# Patient Record
Sex: MALE | Race: WHITE | NOT HISPANIC OR LATINO | Employment: FULL TIME | URBAN - METROPOLITAN AREA
[De-identification: names, ages, dates, MRNs, and addresses within clinical notes are randomized per-mention and may not be internally consistent; named-entity substitution may affect disease eponyms.]

---

## 2017-12-14 ENCOUNTER — HOSPITAL ENCOUNTER (EMERGENCY)
Facility: MEDICAL CENTER | Age: 42
End: 2017-12-14
Attending: EMERGENCY MEDICINE
Payer: COMMERCIAL

## 2017-12-14 VITALS
HEIGHT: 70 IN | DIASTOLIC BLOOD PRESSURE: 83 MMHG | BODY MASS INDEX: 25.6 KG/M2 | TEMPERATURE: 98.3 F | RESPIRATION RATE: 16 BRPM | OXYGEN SATURATION: 96 % | WEIGHT: 178.79 LBS | SYSTOLIC BLOOD PRESSURE: 136 MMHG | HEART RATE: 74 BPM

## 2017-12-14 DIAGNOSIS — J01.10 ACUTE NON-RECURRENT FRONTAL SINUSITIS: ICD-10-CM

## 2017-12-14 PROCEDURE — 99283 EMERGENCY DEPT VISIT LOW MDM: CPT

## 2017-12-14 RX ORDER — FLUTICASONE PROPIONATE 50 MCG
1 SPRAY, SUSPENSION (ML) NASAL 2 TIMES DAILY
Qty: 16 G | Refills: 0 | Status: SHIPPED | OUTPATIENT
Start: 2017-12-14

## 2017-12-14 RX ORDER — AMOXICILLIN AND CLAVULANATE POTASSIUM 875; 125 MG/1; MG/1
1 TABLET, FILM COATED ORAL 2 TIMES DAILY
Qty: 14 TAB | Refills: 0 | Status: SHIPPED | OUTPATIENT
Start: 2017-12-14 | End: 2017-12-21

## 2017-12-14 ASSESSMENT — PAIN SCALES - GENERAL: PAINLEVEL_OUTOF10: 7

## 2017-12-14 NOTE — DISCHARGE INSTRUCTIONS

## 2017-12-14 NOTE — ED PROVIDER NOTES
"ED Provider Note    ER PROVIDER NOTE    Scribed for Josué Hardy M.D. by Josué Hardy. 12/14/2017 at 9:45 AM.      CHIEF COMPLAINT  Chief Complaint   Patient presents with   • Head Pain       HPI  Josué Downey is a 42 y.o. male who presents to the emergency department complaining of Congestion and sinus pressure. He reports that around 1 week to 10 days ago he began developing some congestion and has had some green drainage. Minimal cough. He has subsequently developed some right sided facial pressure more than headache. He denies any fevers or chills. No visual symptoms, neck pain or rash. No focal weakness numbness or tingling.    REVIEW OF SYSTEMS  Pertinent positives include congestion, facial pressure. Pertinent negatives include no fever. See HPI for details. All other systems reviewed and are negative.    PAST MEDICAL HISTORY       SOCIAL HISTORY  Social History   Substance Use Topics   • Smoking status: Current Every Day Smoker   • Smokeless tobacco: Not on file      Comment: 1 pk a day   • Alcohol use Yes      Comment: 6 pack a day       SURGICAL HISTORY   has a past surgical history that includes other.    CURRENT MEDICATIONS  Home Medications     Reviewed by Adela Borjas (Pharmacy Tech) on 12/14/17 at 0956  Med List Status: Complete   Medication Last Dose Status        Patient Brian Taking any Medications                       ALLERGIES  No Known Allergies    PHYSICAL EXAM  VITAL SIGNS: /83   Pulse 74   Temp 36.8 °C (98.3 °F)   Resp 16   Ht 1.778 m (5' 10\")   Wt 81.1 kg (178 lb 12.7 oz)   SpO2 96%   BMI 25.65 kg/m²   Pulse ox interpretation: I interpret this pulse ox as normal.    Constitutional: Alert.  In no apparent distress.  HENT: Normocephalic, Atraumatic, Bilateral external ears normal. Nose normal. Right frontal sinus mildly tender, no erythema or bogginess, TMs are clear, mastoids nontender  NecK: Full range of motion, no rigidity or stiffness  Eyes: Pupils are equal " and reactive. Conjunctiva normal, non-icteric.   Heart: Regular rate and rhythm, no murmurs.    Lungs: Clear to auscultation bilaterally.  Skin: Warm, Dry, No erythema, No rash.   Musculoskeletal: No tenderness or major deformities noted. No edema.  Neurologic: Alert, CN intact, Grossly non-focal.   Psychiatric: Affect normal, Judgment normal, Mood normal, Appears appropriate and not intoxicated.     DIAGNOSTIC STUDIES / PROCEDURES    .    COURSE & MEDICAL DECISION MAKING  Nursing notes, VS, PMSFHx reviewed in chart.    9:45 AM - Patient seen and examined at bedside  Decision Making:  This is a 42 y.o. male presenting with congestion and facial pressure consistent with sinusitis. Given his duration of symptoms would be more likely to benefit from antibiotic, will prescribe Augmentin as well as Flonase. He has no neurologic symptoms or findings on exam significant headache to suggest deep space infection, CST, or fever suggestive of abscess or meningitis.   The patient will return for new or worsening symptoms and is stable at the time of discharge.    The patient is referred to a primary physician for blood pressure management, diabetic screening, and for all other preventative health concerns.    DISPOSITION:  Patient will be discharged home in stable condition.    FOLLOW UP:  Anna Osullivan D.O.  [id number]  [id number]  [facility] [id number] [phone]      As needed      OUTPATIENT MEDICATIONS:  New Prescriptions    AMOXICILLIN-CLAVULANATE (AUGMENTIN) 875-125 MG TAB    Take 1 Tab by mouth 2 times a day for 7 days.    FLUTICASONE (FLONASE) 50 MCG/ACT NASAL SPRAY    Spray 1 Spray in nose 2 times a day.         FINAL IMPRESSION  1. Acute non-recurrent frontal sinusitis         The note accurately reflects work and decisions made by me.  Josué Hardy  12/14/2017  10:04 AM

## 2017-12-14 NOTE — ED NOTES
Right head pain and upper tooth pain for about a week. He thought he was getting a cold but symptom are not resolving but getting worse.